# Patient Record
Sex: MALE | Race: WHITE
[De-identification: names, ages, dates, MRNs, and addresses within clinical notes are randomized per-mention and may not be internally consistent; named-entity substitution may affect disease eponyms.]

---

## 2018-02-17 ENCOUNTER — HOSPITAL ENCOUNTER (EMERGENCY)
Dept: HOSPITAL 59 - ER | Age: 53
Discharge: HOME | End: 2018-02-17
Payer: COMMERCIAL

## 2018-02-17 DIAGNOSIS — L50.0: Primary | ICD-10-CM

## 2018-02-17 PROCEDURE — 99283 EMERGENCY DEPT VISIT LOW MDM: CPT

## 2018-02-17 PROCEDURE — 96372 THER/PROPH/DIAG INJ SC/IM: CPT

## 2018-02-17 NOTE — EMERGENCY DEPARTMENT RECORD
History of Present Illness





- General


Chief complaint: Rash


Stated complaint: RASH


Time Seen by Provider: 02/17/18 08:44


Source: Patient


Mode of Arrival: Ambulatory


Limitations: No limitations





- History of Present Illness


Initial comments: 





51 yo male presents with itchy hives that started yesterday morning upon 

waking.  He has taken 4 doses of Benadryl in the last 24 hours but the hives 

persist.  No cough, oral swelling, or shortness of breath.  He has had hive in 

the past.  No new medications.  He has multiple food allergies.  He did cut 

fire treated wood on Tuesday and was concerned about chemical reaction to that.

  PCP is the [facility].


MD complaint: Rash


-: Hour(s) (24)


Location: Generalized


Severity: Mild


Quality: Other (itches)


Consistency: Constant


Improves with: None


Worsens with: None


Context: Other (Hx of the same)


Associated symptoms: Denies other symptoms, Itching


Treatments Prior to Arrival: Benadryl (4 dose in 24 hours)





- Related Data


 Previous Rx's











 Medication  Instructions  Recorded


 


Prednisone [Prednisone 20Mg] 20 mg PO BID #10 tab 02/17/18











 Allergies











Allergy/AdvReac Type Severity Reaction Status Date / Time


 


gluten Allergy Unknown PT UNSURE Unverified 11/22/16 15:44





   OF REACTION  














Review of Systems


Constitutional: Denies: Chills, Fever, Weakness


Eyes: Denies: Eye discharge, Eye pain


ENT: Denies: Congestion, Ear pain, Throat pain


Respiratory: Denies: Cough, Dyspnea, Hemoptysis, Stridor, Wheezes


Cardiovascular: Denies: Chest pain, Syncope


Endocrine: Denies: Fatigue


Gastrointestinal: Denies: Abdominal pain, Diarrhea, Nausea, Vomiting


Genitourinary: Denies: Dysuria, Frequency, Hematuria


Musculoskeletal: Denies: Arthralgia, Back pain, Myalgia


Skin: Reports: Change in color, Pruritus, Rash.  Denies: Bruising


Neurological: Denies: Confusion, Headache


Psychiatric: Denies: Anxiety


Hematological/Lymphatic: Denies: Blood Clots, Easy bleeding, Easy bruising, 

Swollen glands





Physical Exam





- General


General Appearance: Alert, Oriented x3, Cooperative, No acute distress





- Head


Head exam: Atraumatic, Normocephalic


Head exam detail: Other (few blotchy hives on the face near the right eye lis)





- Eye


Eye exam: Normal appearance, PERRL, Periorbital swelling (mild right lid 

swelling due to hives).  negative: Conjunctival injection





- ENT


ENT exam: Normal exam, Mucous membranes moist, Normal orophraynx


Ear exam: Normal external inspection


Nasal Exam: Normal inspection


Mouth exam: Normal external inspection


Teeth exam: Normal inspection


Throat exam: Normal inspection





- Neck


Neck exam: Normal inspection, Full ROM.  negative: Tenderness





- Respiratory


Respiratory exam: Normal lung sounds bilaterally.  negative: Respiratory 

distress





- Cardiovascular


Cardiovascular Exam: Regular rate, Normal rhythm, Normal heart sounds





- GI/Abdominal


GI/Abdominal exam: negative: Tenderness





- Rectal


Rectal exam: Deferred





- 


 exam: Deferred





- Extremities


Extremities exam: negative: Normal inspection (scattered hives on the lower legs

, back, arms)





- Back


Back exam: Denies: Normal inspection (few scattered hives)





- Neurological


Neurological exam: Alert, Oriented X3





- Psychiatric


Psychiatric exam: Normal affect, Normal mood





- Skin


Skin exam: Urticaria





Course





 Vital Signs











  02/17/18





  08:39


 


Temperature 97.9 F


 


Pulse Rate [ 75





Pulse Ox Probe] 


 


Respiratory 14





Rate 


 


Blood Pressure 154/92





[Left Arm] 


 


Pulse Ox 99














- Reevaluation(s)


Reevaluation #1: 





02/17/18 08:50


Well appearing, no systemic symptoms


We discussed home treatment, follow up with the PCP and future allergy referral 

for testing


We discussed reasons to return as well.





Disposition


Disposition: Discharge


Clinical Impression: 


 Hives





Disposition: Home, Self-Care


Condition: (1) Good


Instructions:  Urticaria (ED)


Additional Instructions: 


Return if worse, changes in the rash, short of breath or any new concerns


Call your doctor for a close recheck after this ER visit


Take the Benadryl 1-2 tablets every 6 hours


Take the Prednisone as directed 


Prescriptions: 


Prednisone [Prednisone 20Mg] 20 mg PO BID #10 tab


Time of Disposition: 08:52





Quality





- Quality Measures


Quality Measures: N/A





- Blood Pressure Screening


Does Patient Have Any of the Following: No


Blood Pressure Classification: Hypertensive Reading


Systolic Measurement: 154


Diastolic Measurement: 92


Screening for High Blood Pressure: < Pre-Hypertensive BP, F/U Documented > [

]


Pre-Hypertensive Follow-up Interventions: Referral to alternative/primary care 

provider.